# Patient Record
Sex: FEMALE | Race: WHITE
[De-identification: names, ages, dates, MRNs, and addresses within clinical notes are randomized per-mention and may not be internally consistent; named-entity substitution may affect disease eponyms.]

---

## 2018-06-20 ENCOUNTER — HOSPITAL ENCOUNTER (OUTPATIENT)
Dept: HOSPITAL 47 - RADMAMWWP | Age: 58
Discharge: HOME | End: 2018-06-20
Payer: COMMERCIAL

## 2018-06-20 DIAGNOSIS — Z12.31: Primary | ICD-10-CM

## 2018-06-20 DIAGNOSIS — Z78.0: ICD-10-CM

## 2018-06-20 DIAGNOSIS — M85.80: ICD-10-CM

## 2018-06-20 PROCEDURE — 77080 DXA BONE DENSITY AXIAL: CPT

## 2018-06-20 PROCEDURE — 77067 SCR MAMMO BI INCL CAD: CPT

## 2018-06-21 NOTE — MM
Reason for exam: screening  (asymptomatic).

Last mammogram was performed 3 years and 4 months ago.



History:

Patient is postmenopausal.

Benign right mammotome panel of the right breast, February 5, 2007.

Took progesterone for 2 years beginning at age 45.



Physical Findings:

A clinical breast exam by your physician is recommended on an annual basis and 

results should be correlated with mammographic findings.



MG Screening Mammo w CAD

Bilateral CC and MLO view(s) were taken.

Prior study comparison: February 11, 2015, bilateral MG screening mammo w CAD.  

September 21, 2011, CAD bilateral diagnostic mammogram.

The breast tissue is heterogeneously dense. This may lower the sensitivity of 

mammography.  There is a benign appearing left calcification. Right biopsy marker 

noted.





ASSESSMENT: Benign, BI-RAD 2



RECOMMENDATION:

Routine screening mammogram of both breasts in 1 year.

## 2019-03-21 NOTE — BD
EXAMINATION TYPE: Axial Bone Density

 

DATE OF EXAM: 6/20/2018

 

COMPARISON: 2009

 

CLINICAL HISTORY: post menopausal

 

Height:  5'6

Weight:  185

 

FRAX RISK QUESTIONS:

History of Fracture in Adulthood: y

Secondary Osteoporosis:

  

RISK FACTORS 

HISTORY OF: 

Postmenopausal woman: y

 

MEDICATIONS: 

Additional Medications: heartburn, asthma 

 

 

Additional History: skin cancer 2016

 

 

EXAM MEASUREMENTS: 

Bone mineral densitometry was performed using the [facility].

Bone mineral density as measured about the Lumbar spine is:  

----- L1-L4(G/cm2): 1.065

T Score Values are as follows:

----- L2: -1.3

----- L3: -1.0

----- L4: -1.2

----- L1-L4: -1.0

Bone mineral density has: Decreased -4.9% since study of: 05/06/2009

 

Bone mineral density about the R hip (g/cm2): 0.839

Bone mineral density about the L hip (g/cm2): 0.892

T Score values are as follows:

-----R Neck: -1.4

-----L Neck: -1.1

-----R Total: -0.5

-----L Total: 0.1

Bone mineral density has: Decreased -7.9% since study of: 05/06/2009

 

 

IMPRESSION:

Osteopenia (T Score between -2.5 and -1).

 

There is slightly increased risk of fracture and the patient may be considered 

for treatment. 

 

Re-Screen 2-5 years.

 

 

 

 

 

NOTE:  T-SCORE=SD OF THE YOUNG ADULT MEAN. no

## 2019-11-01 ENCOUNTER — HOSPITAL ENCOUNTER (OUTPATIENT)
Dept: HOSPITAL 47 - RADMAMWWP | Age: 59
Discharge: HOME | End: 2019-11-01
Payer: COMMERCIAL

## 2019-11-01 DIAGNOSIS — Z12.31: Primary | ICD-10-CM

## 2019-11-01 PROCEDURE — 77067 SCR MAMMO BI INCL CAD: CPT

## 2020-12-17 ENCOUNTER — HOSPITAL ENCOUNTER (EMERGENCY)
Dept: HOSPITAL 47 - EC | Age: 60
Discharge: HOME | End: 2020-12-17
Payer: COMMERCIAL

## 2020-12-17 VITALS
DIASTOLIC BLOOD PRESSURE: 99 MMHG | SYSTOLIC BLOOD PRESSURE: 147 MMHG | HEART RATE: 87 BPM | RESPIRATION RATE: 18 BRPM | TEMPERATURE: 98 F

## 2020-12-17 DIAGNOSIS — Z88.8: ICD-10-CM
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
## 2020-12-17 DIAGNOSIS — S20.221A: ICD-10-CM

## 2020-12-17 DIAGNOSIS — E78.5: ICD-10-CM

## 2020-12-17 DIAGNOSIS — Z80.9: ICD-10-CM

## 2020-12-17 DIAGNOSIS — Z85.828: ICD-10-CM

## 2020-12-17 DIAGNOSIS — K21.9: ICD-10-CM
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
## 2020-12-17 DIAGNOSIS — Z88.7: ICD-10-CM

## 2020-12-17 DIAGNOSIS — S40.021A: Primary | ICD-10-CM

## 2020-12-17 PROCEDURE — 99283 EMERGENCY DEPT VISIT LOW MDM: CPT

## 2020-12-17 PROCEDURE — 71046 X-RAY EXAM CHEST 2 VIEWS: CPT
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
## 2021-03-10 ENCOUNTER — HOSPITAL ENCOUNTER (OUTPATIENT)
Dept: HOSPITAL 47 - RADMAMWWP | Age: 61
Discharge: HOME | End: 2021-03-10
Attending: FAMILY MEDICINE
Payer: COMMERCIAL

## 2021-03-10 DIAGNOSIS — Z78.0: ICD-10-CM

## 2021-03-10 DIAGNOSIS — M85.80: ICD-10-CM

## 2021-03-10 DIAGNOSIS — Z12.31: Primary | ICD-10-CM

## 2021-03-10 PROCEDURE — 77067 SCR MAMMO BI INCL CAD: CPT

## 2021-03-10 PROCEDURE — 77080 DXA BONE DENSITY AXIAL: CPT
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
## 2021-03-11 NOTE — MM
Reason for exam: screening  (asymptomatic).

Last mammogram was performed 1 year and 4 months ago.



History:

Patient is postmenopausal and history of other cancer.

Benign right mammotome panel of the right breast, February 5, 2007.



Physical Findings:

A clinical breast exam by your physician is recommended on an annual basis and 

results should be correlated with mammographic findings.



MG Screening Mammo w CAD

Bilateral CC and MLO view(s) were taken.

Prior study comparison: November 1, 2019, bilateral MG screening mammo w CAD.  

June 20, 2018, bilateral MG screening mammo w CAD.

The breast tissue is heterogeneously dense. This may lower the sensitivity of 

mammography.  Benign appearing bilateral calcifications. Previous mammotome biopsy

in the right breast.  No significant changes when compared with prior studies.





ASSESSMENT: Benign, BI-RAD 2



RECOMMENDATION:

Routine screening mammogram of both breasts in 1 year.

## 2022-08-01 ENCOUNTER — HOSPITAL ENCOUNTER (OUTPATIENT)
Dept: HOSPITAL 47 - RADMAMWWP | Age: 62
Discharge: HOME | End: 2022-08-01
Attending: FAMILY MEDICINE
Payer: COMMERCIAL

## 2022-08-01 DIAGNOSIS — Z12.31: Primary | ICD-10-CM

## 2022-08-01 DIAGNOSIS — R92.1: ICD-10-CM

## 2022-08-01 DIAGNOSIS — Z78.0: ICD-10-CM

## 2022-08-01 PROCEDURE — 77067 SCR MAMMO BI INCL CAD: CPT

## 2022-08-02 NOTE — MM
Reason for Exam: Screening  (asymptomatic). 

Last mammogram was performed 1 year(s) and 5 month(s) ago. 





Patient History: 

Menarche at age 14. First Full-Term Pregnancy at age 21. Postmenopausal. Other cancer. 2/5/2007,

Benign Core Biopsy on the right side. 





Risk Values: 

Carrie 5 year model risk: 1.6%.

NCI Lifetime model risk: 6.3%.





Prior Study Comparison: 

6/20/2018 Bilateral Screening Mammogram, [facility]. 11/1/2019 Bilateral Screening Mammogram, [facility].

3/10/2021 Bilateral Screening Mammogram, [facility]. 





Tissue Density: 

There are scattered fibroglandular densities.





Findings: 

Analyzed By CAD. 

There is a round calcification within the left breast. 8 core markers within the right breast.

No suspicious groups of microcalcifications, spiculated or lobular masses, architectural distortion

or other secondary signs of malignancy are mammographically apparent. 





Overall Assessment: Benign, BI-RAD 2





Management: 

Screening Mammogram of both breasts in 1 year.

A negative mammogram report should not preclude additional follow up of suspicious palpable

abnormalities.

Patient should continue monthly self breast exam.

A clinical breast exam by your physician is recommended on an annual basis and results should be

correlated with mammographic findings.



Electronically signed and approved by: Demetrio Rodriguez D.O. Radiologis

## 2023-08-28 ENCOUNTER — HOSPITAL ENCOUNTER (OUTPATIENT)
Dept: HOSPITAL 47 - RADBDWWP | Age: 63
Discharge: HOME | End: 2023-08-28
Attending: FAMILY MEDICINE
Payer: COMMERCIAL

## 2023-08-28 DIAGNOSIS — Z12.31: Primary | ICD-10-CM

## 2023-08-28 DIAGNOSIS — Z13.820: ICD-10-CM

## 2023-08-28 DIAGNOSIS — Z78.0: ICD-10-CM

## 2023-08-28 DIAGNOSIS — M85.89: ICD-10-CM

## 2023-08-28 DIAGNOSIS — Z79.52: ICD-10-CM

## 2023-08-28 PROCEDURE — 77063 BREAST TOMOSYNTHESIS BI: CPT

## 2023-08-28 PROCEDURE — 77080 DXA BONE DENSITY AXIAL: CPT

## 2023-08-28 PROCEDURE — 77067 SCR MAMMO BI INCL CAD: CPT

## 2023-08-29 NOTE — MM
Reason for Exam: Screening  (asymptomatic). 

Last screening mammogram was performed 12 month(s) ago.





Patient History: 

Menarche at age 14. First Full-Term Pregnancy at age 21. Postmenopausal. Other cancer. 2/5/2007,

Benign Core Biopsy on the right side. 





Risk Values: 

Carrie 5 year model risk: 1.6%.

NCI Lifetime model risk: 6.1%.





Prior Study Comparison: 

11/1/2019 Bilateral Screening Mammogram, [facility]. 3/10/2021 Bilateral Screening Mammogram, [facility]. 8/1/2022

Bilateral MG screening mammo w CAD, [facility]. 





Tissue Density: 

The breast tissue is heterogeneously dense. This may lower the sensitivity of mammography.





Findings: 

Analyzed By CAD. 

There is no suspicious group of microcalcifications or new suspicious mass in either breast. 





Overall Assessment: Benign, BI-RAD 2





Management: 

Screening Mammogram of both breasts in 1 year.

.



Patient should continue monthly self-breast exams.  A clinical breast exam by your physician is

recommended on an annual basis.

This exam should not preclude additional follow-up of suspicious palpable abnormalities.



Note on Carrie scores and lifetime risk:

1. A Carrie score greater than 3% is considered moderate risk. If this is the case, consider

specialist referral to assess eligibility for a risk reducing agent.

2. If overall lifetime risk for the development of breast cancer is 20% or higher, the patient may

qualify for future screening with alternating mammogram and breast MRI.



Electronically signed and approved by: Leopold M. Fregoli, M.D. Radiologis

## 2024-09-26 ENCOUNTER — HOSPITAL ENCOUNTER (OUTPATIENT)
Dept: HOSPITAL 47 - RADMAMWWP | Age: 64
Discharge: HOME | End: 2024-09-26
Attending: FAMILY MEDICINE
Payer: MEDICARE

## 2024-09-26 PROCEDURE — 77066 DX MAMMO INCL CAD BI: CPT

## 2024-09-26 PROCEDURE — 77062 BREAST TOMOSYNTHESIS BI: CPT

## 2024-09-26 NOTE — MM
Reason for Exam: Clinical finding. 

Last mammogram was performed 1 year(s) and 1 month(s) ago. 

Indicated Problems: 

Lump or thickening of the right side for 1 Month(s).





Patient History: 

Menarche at age 14. First Full-Term Pregnancy at age 21. Postmenopausal. Other cancer. 2/5/2007,

Benign Core Biopsy on the right side. 





Risk Values: 

Carrie 5 year model risk: 1.6%.

NCI Lifetime model risk: 5.8%.





Prior Study Comparison: 

11/1/2019 Bilateral Screening Mammogram, [facility]. 3/10/2021 Bilateral Screening Mammogram, [facility]. 8/1/2022

Bilateral MG screening mammo w CAD, [facility]. 8/28/2023 Bilateral MG 3D screening mammo w/cad, [facility]. 





Tissue Density: 

There are scattered areas of fibroglandular density.





Findings: 

Analyzed By CAD. 

The pattern is symmetrical.

No significant interval change is evident. No suspicious abnormality at the area of concern marked

on the right breast. There is a core marker in the right breast.

No suspicious groups of microcalcifications, spiculated or lobular masses, architectural distortion

or other secondary signs of malignancy are mammographically apparent. 





Overall Assessment: Incomplete: need additional imaging evaluation, BI-RAD 0





Management: 

Diagnostic Breast Ultrasound of the right breast.

A negative mammogram report should not preclude additional follow up of suspicious palpable

abnormalities.

Patient should continue monthly self breast exam.

A clinical breast exam by your physician is recommended on an annual basis and results should be

correlated with mammographic findings.



Note on Carrie scores and lifetime risk:

1. A Carrie score greater than 3% is considered moderate risk. If this is the case, consider

specialist referral to assess eligibility for a risk reducing agent.

2. If overall lifetime risk for the development of breast cancer is 20% or higher, the patient may

qualify for future screening with alternating mammogram and breast MRI.



[facility], Workstation: [id number], 9/26/2024 8:48 AM.



Electronically signed and approved by: Demetrio Rodriguez D.O. Radiologis

## 2024-09-26 NOTE — USB
Reason for Exam: Clinical finding. 





Patient History: 

Menarche at age 14. First Full-Term Pregnancy at age 21. Postmenopausal. Other cancer. 2/5/2007,

Benign Core Biopsy on the right side. 





Risk Values: 

Carrie 5 year model risk: 1.6%.

NCI Lifetime model risk: 5.8%.

Technique: 

Method: Targeted.  





Prior Study Comparison: 

3/10/2021 Bilateral Screening Mammogram, [facility]. 8/1/2022 Bilateral MG screening mammo w CAD, [facility].

8/28/2023 Bilateral MG 3D screening mammo w/cad, [facility]. 





Findings: 

The area of palpable concern of the right breast, the axilla of the right breast and the

retroareolar of the right breast were scanned.

No solid or cystic masses are identified.. 





Overall Assessment: Negative, BI-RAD 1





Management: 

Screening Mammogram of both breasts in 1 year.

A clinical breast exam by your physician is recommended on an annual basis and results should be

correlated with mammographic findings.  This exam should not preclude additional follow-up of

suspicious palpable abnormalities. Results were given to the patient verbally at the time of exam.



[facility], Workstation: [id number], 9/26/2024 8:46 AM.



Electronically signed and approved by: Demetrio Rodriguez D.O. Radiologis